# Patient Record
Sex: FEMALE | Race: BLACK OR AFRICAN AMERICAN | NOT HISPANIC OR LATINO | Employment: UNEMPLOYED | URBAN - METROPOLITAN AREA
[De-identification: names, ages, dates, MRNs, and addresses within clinical notes are randomized per-mention and may not be internally consistent; named-entity substitution may affect disease eponyms.]

---

## 2017-04-19 ENCOUNTER — TELEPHONE (OUTPATIENT)
Dept: OBSTETRICS AND GYNECOLOGY | Facility: CLINIC | Age: 32
End: 2017-04-19

## 2017-04-19 DIAGNOSIS — Z34.90 PREGNANCY, UNSPECIFIED GESTATIONAL AGE: Primary | ICD-10-CM

## 2017-04-19 NOTE — TELEPHONE ENCOUNTER
----- Message from Marylou Pierre sent at 4/19/2017  2:39 PM CDT -----  X_  1st Request  _  2nd Request  _  3rd Request        Who: BENITO MCCOY [[id number]]    Why: pt is needing to schedule initial OB appt pt LMP sometime in February     What Number to Call Back: [phone].    When to Expect a call back: (Before the end of the day)   -- if the call is after 12:00, the call back will be tomorrow.

## 2017-04-26 ENCOUNTER — HOSPITAL ENCOUNTER (EMERGENCY)
Facility: OTHER | Age: 32
Discharge: HOME | End: 2017-04-26
Attending: EMERGENCY MEDICINE
Payer: OTHER GOVERNMENT

## 2017-04-26 ENCOUNTER — TELEPHONE (OUTPATIENT)
Dept: OBSTETRICS AND GYNECOLOGY | Facility: CLINIC | Age: 32
End: 2017-04-26

## 2017-04-26 VITALS
HEIGHT: 65 IN | TEMPERATURE: 98 F | SYSTOLIC BLOOD PRESSURE: 109 MMHG | DIASTOLIC BLOOD PRESSURE: 71 MMHG | RESPIRATION RATE: 14 BRPM | BODY MASS INDEX: 24.16 KG/M2 | HEART RATE: 95 BPM | OXYGEN SATURATION: 99 % | WEIGHT: 145 LBS

## 2017-04-26 DIAGNOSIS — B96.89 BACTERIAL VAGINOSIS: ICD-10-CM

## 2017-04-26 DIAGNOSIS — D25.9 UTERINE LEIOMYOMA, UNSPECIFIED LOCATION: ICD-10-CM

## 2017-04-26 DIAGNOSIS — N76.0 BACTERIAL VAGINOSIS: ICD-10-CM

## 2017-04-26 DIAGNOSIS — R10.2 PELVIC PAIN IN PREGNANCY: Primary | ICD-10-CM

## 2017-04-26 DIAGNOSIS — O26.899 PELVIC PAIN IN PREGNANCY: Primary | ICD-10-CM

## 2017-04-26 LAB
ABO + RH BLD: NORMAL
ALBUMIN SERPL BCP-MCNC: 3.4 G/DL
ALP SERPL-CCNC: 69 U/L
ALT SERPL W/O P-5'-P-CCNC: 14 U/L
ANION GAP SERPL CALC-SCNC: 10 MMOL/L
AST SERPL-CCNC: 16 U/L
B-HCG UR QL: POSITIVE
BACTERIA GENITAL QL WET PREP: ABNORMAL
BASOPHILS # BLD AUTO: 0.02 K/UL
BASOPHILS NFR BLD: 0.2 %
BILIRUB SERPL-MCNC: 0.3 MG/DL
BILIRUB UR QL STRIP: NEGATIVE
BLD GP AB SCN CELLS X3 SERPL QL: NORMAL
BUN SERPL-MCNC: 9 MG/DL
CALCIUM SERPL-MCNC: 10.2 MG/DL
CHLORIDE SERPL-SCNC: 104 MMOL/L
CLARITY UR: CLEAR
CLUE CELLS VAG QL WET PREP: ABNORMAL
CO2 SERPL-SCNC: 21 MMOL/L
COLOR UR: YELLOW
CREAT SERPL-MCNC: 0.7 MG/DL
CTP QC/QA: YES
DIFFERENTIAL METHOD: ABNORMAL
EOSINOPHIL # BLD AUTO: 0.1 K/UL
EOSINOPHIL NFR BLD: 1.2 %
ERYTHROCYTE [DISTWIDTH] IN BLOOD BY AUTOMATED COUNT: 14.2 %
EST. GFR  (AFRICAN AMERICAN): >60 ML/MIN/1.73 M^2
EST. GFR  (NON AFRICAN AMERICAN): >60 ML/MIN/1.73 M^2
FILAMENT FUNGI VAG WET PREP-#/AREA: ABNORMAL
GLUCOSE SERPL-MCNC: 87 MG/DL
GLUCOSE UR QL STRIP: NEGATIVE
HCT VFR BLD AUTO: 31.1 %
HGB BLD-MCNC: 10.3 G/DL
HGB UR QL STRIP: NEGATIVE
KETONES UR QL STRIP: ABNORMAL
LEUKOCYTE ESTERASE UR QL STRIP: NEGATIVE
LYMPHOCYTES # BLD AUTO: 1.8 K/UL
LYMPHOCYTES NFR BLD: 16.9 %
MCH RBC QN AUTO: 27 PG
MCHC RBC AUTO-ENTMCNC: 33.1 %
MCV RBC AUTO: 81 FL
MONOCYTES # BLD AUTO: 0.6 K/UL
MONOCYTES NFR BLD: 5.3 %
NEUTROPHILS # BLD AUTO: 8 K/UL
NEUTROPHILS NFR BLD: 76.1 %
NITRITE UR QL STRIP: NEGATIVE
PH UR STRIP: 7 [PH] (ref 5–8)
PLATELET # BLD AUTO: 277 K/UL
PMV BLD AUTO: 9.7 FL
POTASSIUM SERPL-SCNC: 3.8 MMOL/L
PROT SERPL-MCNC: 8.6 G/DL
PROT UR QL STRIP: NEGATIVE
RBC # BLD AUTO: 3.82 M/UL
SODIUM SERPL-SCNC: 135 MMOL/L
SP GR UR STRIP: 1.01 (ref 1–1.03)
SPECIMEN SOURCE: ABNORMAL
T VAGINALIS GENITAL QL WET PREP: ABNORMAL
URN SPEC COLLECT METH UR: ABNORMAL
UROBILINOGEN UR STRIP-ACNC: NEGATIVE EU/DL
WBC # BLD AUTO: 10.43 K/UL
WBC #/AREA VAG WET PREP: ABNORMAL
YEAST GENITAL QL WET PREP: ABNORMAL

## 2017-04-26 PROCEDURE — 96360 HYDRATION IV INFUSION INIT: CPT

## 2017-04-26 PROCEDURE — 87591 N.GONORRHOEAE DNA AMP PROB: CPT

## 2017-04-26 PROCEDURE — 85025 COMPLETE CBC W/AUTO DIFF WBC: CPT

## 2017-04-26 PROCEDURE — 25000003 PHARM REV CODE 250: Performed by: PHYSICIAN ASSISTANT

## 2017-04-26 PROCEDURE — 81003 URINALYSIS AUTO W/O SCOPE: CPT

## 2017-04-26 PROCEDURE — 99284 EMERGENCY DEPT VISIT MOD MDM: CPT | Mod: 25

## 2017-04-26 PROCEDURE — 86900 BLOOD TYPING SEROLOGIC ABO: CPT

## 2017-04-26 PROCEDURE — 86901 BLOOD TYPING SEROLOGIC RH(D): CPT

## 2017-04-26 PROCEDURE — 81025 URINE PREGNANCY TEST: CPT | Performed by: EMERGENCY MEDICINE

## 2017-04-26 PROCEDURE — 87210 SMEAR WET MOUNT SALINE/INK: CPT

## 2017-04-26 PROCEDURE — 80053 COMPREHEN METABOLIC PANEL: CPT

## 2017-04-26 RX ORDER — METRONIDAZOLE 500 MG/1
500 TABLET ORAL 2 TIMES DAILY
Qty: 14 TABLET | Refills: 0 | Status: SHIPPED | OUTPATIENT
Start: 2017-04-26 | End: 2017-05-03

## 2017-04-26 RX ADMIN — SODIUM CHLORIDE 1000 ML: 0.9 INJECTION, SOLUTION INTRAVENOUS at 12:04

## 2017-04-26 NOTE — ED TRIAGE NOTES
Pt c/o low abdominal pain and low back pain since Sunday - no vag bleeding noted - pt is pregnant and has appt 5/10.

## 2017-04-26 NOTE — ED AVS SNAPSHOT
[facility]  [address]  [facility] [address]               Indy Narvaez   2017 12:06 PM   ED    Description:  Female : 1985   Department:  [facility]           Your Care was Coordinated By:     Provider Role From To    Raissa Singleton MD Attending Provider [phone] --    Carlota White PA-C Physician Assistant 17 [phone] --      Reason for Visit     Abdominal Pain           Diagnoses this Visit        Comments    Pelvic pain in pregnancy    -  Primary     Uterine leiomyoma, unspecified location         Bacterial vaginosis           ED Disposition     None           To Do List           Follow-up Information     Follow up with Zaida Maher MD. Schedule an appointment as soon as possible for a visit in 2 days.    Specialty:  Obstetrics and Gynecology    Contact information:    [address] [facility] [address]  [phone]         These Medications        Disp Refills Start End    metronidazole (FLAGYL) 500 MG tablet 14 tablet 0 2017 5/3/2017    Take 1 tablet (500 mg total) by mouth 2 (two) times daily. - Oral      [facility] On Call     [facility] On Call Nurse Care Line -  Assistance  Unless otherwise directed by your provider, please contact [facility], our nurse care line that is available for  assistance.     Registered nurses in the [facility] On Call Center provide: appointment scheduling, clinical advisement, health education, and other advisory services.  Call: [phone] (toll free)               Medications           Message regarding Medications     Verify the changes and/or additions to your medication regime listed below are the same as discussed with your clinician today.  If any of these changes or additions are incorrect, please notify your healthcare provider.        START taking these NEW medications        Refills    metronidazole (FLAGYL) 500 MG tablet 0    Sig: Take 1  "tablet (500 mg total) by mouth 2 (two) times daily.    Class: Print    Route: Oral      These medications were administered today        Dose Freq    sodium chloride 0.9% bolus 1,000 mL 1,000 mL ED 1 Time    Sig: Inject 1,000 mLs into the vein ED 1 Time.    Class: Normal    Route: Intravenous           Verify that the below list of medications is an accurate representation of the medications you are currently taking.  If none reported, the list may be blank. If incorrect, please contact your healthcare provider. Carry this list with you in case of emergency.           Current Medications     PNV with Ca,no.74-iron-FA 27-1 mg Tab Take by mouth.    metronidazole (FLAGYL) 500 MG tablet Take 1 tablet (500 mg total) by mouth 2 (two) times daily.           Clinical Reference Information           Your Vitals Were     BP Pulse Temp Resp Height Weight    114/79 (BP Location: Left arm, Patient Position: Sitting) 121 97.9 °F (36.6 °C) (Oral) 18 5' 5" (1.651 m) 65.8 kg (145 lb)    SpO2 BMI             98% 24.13 kg/m2         Allergies as of 4/26/2017     No Known Allergies      Immunizations Administered on Date of Encounter - 4/26/2017     None      ED Micro, Lab, POCT     Start Ordered       Status Ordering Provider    04/26/17 1317 04/26/17 1316  C. trachomatis/N. gonorrhoeae by AMP DNA Cervicovaginal  STAT      In process     04/26/17 1317 04/26/17 1316  Vaginal Screen Vagina  STAT      Final result     04/26/17 1218 04/26/17 1217  CBC W/ AUTO DIFFERENTIAL  STAT      Final result     04/26/17 1218 04/26/17 1217  Comp. Metabolic Panel  STAT      Final result     04/26/17 1218 04/26/17 1217  Urinalysis  STAT      Final result     04/26/17 1146 04/26/17 1145  POCT urine pregnancy  Once      Final result       ED Imaging Orders     Start Ordered       Status Ordering Provider    04/26/17 1419 04/26/17 1217  US OB More Than 14 Wks First Gestation  1 time imaging      Final result     04/26/17 1218 04/26/17 1217    1 time " imaging,   Status:  Canceled      Canceled       Discharge References/Attachments     VAGINAL INFECTION: BACTERIAL VAGINOSIS (ENGLISH)    UTERINE FIBROIDS (ENGLISH)      Your Scheduled Appointments     May 10, 2017  8:20 AM CDT   New Gynecological with Iva Mcgowan NP   [facility] [address] ([facility])    [address]  [facility] [address]   [phone]            May 10, 2017  9:00 AM CDT   Ultrasound with ULTRASOUND, [facility]   [facility] [address] ([facility])    [address]  [facility] [address]   [phone]              [facility] Sign-Up     Activating your [facility] account is as easy as 1-2-3!     1) Visit [email or web address], select Sign Up Now, enter this activation code and your date of birth, then select Next.  [email or web address]  Expires: 6/10/2017  2:56 PM      2) Create a username and password to use when you visit [facility] in the future and select a security question in case you lose your password and select Next.    3) Enter your e-mail address and click Sign Up!    Additional Information  If you have questions, please e-mail [email or web address] or call [phone] to talk to our [facility] staff. Remember, [facility] is NOT to be used for urgent needs. For medical emergencies, dial 911.          [facility] complies with applicable Federal civil rights laws and does not discriminate on the basis of race, color, national origin, age, disability, or sex.        Language Assistance Services     ATTENTION: Language assistance services are available, free of charge. Please call [phone].      ATENCIÓN: Si sophiela daniel, tiene a gallegos disposición servicios gratuitos de asistencia lingüística. Llame al [phone].     CHÚ Ý: N?u b?n nói Ti?ng Vi?t, có các d?ch v? h? tr? ngôn ng? mi?n phí dành cho b?n. G?i s? [phone].

## 2017-04-26 NOTE — ED PROVIDER NOTES
Encounter Date: 4/26/2017       History     Chief Complaint   Patient presents with    Abdominal Pain     lower abd pain and low back pain since Sunday. denies any n/v/d or fever/chills. denies any difficulty urinating or discharge. 2 months pregnant     Review of patient's allergies indicates:  No Known Allergies  HPI Comments: 31-year-old female with history of uterine fibroids presents to the emergency department with complaints of pelvic pain and low back pain in early pregnancy.  She states her last menstrual cycle was in February.  She states that she thinks that she is approximately 2 months pregnant.  She denies vaginal bleeding, dysuria, hematuria, fever, chills, vaginal discharge, nausea, vomiting, diarrhea or other associated symptoms.  She states the pain is a 9 out of 10 and has been present since Sunday.  Patient states that this is her first pregnancy    The history is provided by the patient and the spouse.     History reviewed. No pertinent past medical history.  Past Surgical History:   Procedure Laterality Date    UTERINE FIBROID SURGERY  2010     History reviewed. No pertinent family history.  Social History   Substance Use Topics    Smoking status: Never Smoker    Smokeless tobacco: None    Alcohol use Yes     Review of Systems   Constitutional: Negative for chills and fever.   HENT: Negative for sore throat.    Respiratory: Negative for shortness of breath.    Cardiovascular: Negative for chest pain.   Gastrointestinal: Positive for abdominal pain. Negative for diarrhea, nausea and vomiting.   Genitourinary: Positive for pelvic pain. Negative for difficulty urinating, dysuria, flank pain, frequency, hematuria, vaginal bleeding and vaginal discharge.   Musculoskeletal: Positive for back pain.   Skin: Negative for rash.   Neurological: Negative for weakness.   Hematological: Does not bruise/bleed easily.       Physical Exam   Initial Vitals   BP Pulse Resp Temp SpO2   04/26/17 1136 04/26/17  1136 04/26/17 1136 04/26/17 1136 04/26/17 1136   114/79 121 18 97.9 °F (36.6 °C) 98 %     Physical Exam    Nursing note and vitals reviewed.  Constitutional: Vital signs are normal. She appears well-developed and well-nourished.  Non-toxic appearance. No distress.   HENT:   Head: Normocephalic and atraumatic.   Right Ear: External ear normal.   Left Ear: External ear normal.   Nose: Nose normal.   Mouth/Throat: Oropharynx is clear and moist.   Eyes: Conjunctivae, EOM and lids are normal. Pupils are equal, round, and reactive to light. No scleral icterus.   Neck: Normal range of motion and phonation normal. Neck supple.   Cardiovascular: Regular rhythm and normal heart sounds. Tachycardia present.  Exam reveals no gallop and no friction rub.    No murmur heard.  Pulmonary/Chest: Effort normal and breath sounds normal. No respiratory distress. She has no decreased breath sounds. She has no wheezes. She has no rhonchi. She has no rales.   Abdominal: Soft. Normal appearance and bowel sounds are normal. She exhibits distension. She exhibits no mass. There is no tenderness. There is no rigidity, no rebound, no guarding, no CVA tenderness, no tenderness at McBurney's point and negative Arvizu's sign.       Well-healed postsurgical scars status post myomectomy.  No surrounding erythema, warmth, induration or edema.  Distended abdomen which appears to be consistent with gravid uterus however it is not symmetrical and I'm concern that this may be secondary to uterine fibroid   Genitourinary: Pelvic exam was performed with patient supine. There is no tenderness or lesion on the right labia. There is no tenderness or lesion on the left labia. Uterus is enlarged and tender. Cervix exhibits no motion tenderness, no discharge and no friability. Right adnexum displays no tenderness. Left adnexum displays no tenderness. No bleeding in the vagina. Vaginal discharge found.   Genitourinary Comments: No acute vaginal discharge.  Uterus  is enlarged with palpable mass concerning for uterine fibroids.   Musculoskeletal: Normal range of motion.   No obvious deformities, moving all extremities, normal gait   Neurological: She is alert and oriented to person, place, and time. She has normal strength and normal reflexes. No sensory deficit.   Skin: Skin is warm, dry and intact. No lesion and no rash noted. No erythema.   Psychiatric: She has a normal mood and affect. Her speech is normal and behavior is normal. Judgment normal. Cognition and memory are normal.         ED Course   Procedures  Labs Reviewed   CBC W/ AUTO DIFFERENTIAL - Abnormal; Notable for the following:        Result Value    RBC 3.82 (*)     Hemoglobin 10.3 (*)     Hematocrit 31.1 (*)     MCV 81 (*)     Gran # 8.0 (*)     Gran% 76.1 (*)     Lymph% 16.9 (*)     All other components within normal limits   COMPREHENSIVE METABOLIC PANEL - Abnormal; Notable for the following:     Sodium 135 (*)     CO2 21 (*)     Total Protein 8.6 (*)     Albumin 3.4 (*)     All other components within normal limits   URINALYSIS - Abnormal; Notable for the following:     Ketones, UA Trace (*)     All other components within normal limits   VAGINAL SCREEN - Abnormal; Notable for the following:     Clue Cells, Wet Prep Few (*)     WBC - Vaginal Screen Occasional (*)     Bacteria - Vaginal Screen Moderate (*)     All other components within normal limits   POCT URINE PREGNANCY - Abnormal; Notable for the following:     POC Preg Test, Ur Positive (*)     All other components within normal limits   C. TRACHOMATIS/N. GONORRHOEAE BY AMP DNA   TYPE & SCREEN        Imaging Results         US OB More Than 14 Wks First Gestation (Final result) Result time:  04/26/17 14:26:58    Procedure changed from US OB Less Than 14 Wks with Transvag(xpd        Final result by Kwadwo Feliz III, MD (04/26/17 14:26:58)    Narrative:    Findings: Heart rate 150 beats per minute. Placenta anterior. Cervix obscured by a fibroid.  Femur length 1.22 CM corresponds to 13 weeks 5 days.      Electronically signed by: ROWDY ESTES  Date:     04/26/17  Time:    14:26                  Medical Decision Making:   History:   I obtained history from: someone other than patient.       <> Summary of History: spouse  Old Medical Records: I decided to obtain old medical records.  Old Records Summarized: records from clinic visits and other records.  Initial Assessment:   31-year-old female with complaints consistent pelvic pain in pregnancy secondary to uterine fibroids.  Initially tachycardic however improved while in the emergency department.  Heart rate now 93 beats per minute. Vital signs stable, afebrile, neurovascular intact.  She is alert, healthy and nontoxic appearing.  She is in no apparent distress.  Exam documented above.  Palpable fibroid on exam both transabdominally and on pelvic exam.  Clinical Tests:   Lab Tests: Ordered and Reviewed  Radiological Study: Ordered and Reviewed  ED Management:  Bedside ultrasound performed by me.  It does appear to be viable fetus with an uterus however hard to visualize likely secondary to early gestation versus distorted anatomy secondary to uterine fibroid.  No elevation in white blood cell count and H&H stable at 10.3 and 31.1.  No vaginal bleeding at this time.  Ultrasound consistent with IUP consistent with 13 weeks 5 days gestation.  Heart rate is 150 bpm.  Patient's cervix is instructed by fibroid.  Placenta is anterior.  She does have a few clue cells so we will treat for possible BV.  2:48 PM discussed with GYN resident on call, Dr Stoll. Recommends tylenol and close f/u with GYN. Appointment scheduled with Dr Maher in 2 days according to GYN. Patient states that her appointment is on May 10. She is urged to call for sooner appointment/f/u in ED if symptoms worsen. She states understanding.  She stable at this time and will be discharged home with care instructions.  This patient was discussed  with the attending physician who agrees with treatment plan.   Other:   I have discussed this case with another health care provider.       <> Summary of the Discussion: Mani  This note was created using Dragon Medical dictation.  There may be typographical errors secondary to dictation.                     ED Course     Clinical Impression:     1. Pelvic pain in pregnancy    2. Uterine leiomyoma, unspecified location    3. Bacterial vaginosis          Disposition:   Disposition: Discharged  Condition: Stable       Carlota White PA-C  04/26/17 1453

## 2017-04-27 ENCOUNTER — TELEPHONE (OUTPATIENT)
Dept: OBSTETRICS AND GYNECOLOGY | Facility: CLINIC | Age: 32
End: 2017-04-27

## 2017-04-27 LAB
C TRACH DNA SPEC QL NAA+PROBE: NOT DETECTED
N GONORRHOEA DNA SPEC QL NAA+PROBE: NOT DETECTED

## 2017-04-27 NOTE — TELEPHONE ENCOUNTER
Called patient and rescheduled her Routine OB visit for Monday 5/1/17. This appointment is also a follow up from a ED visit on 4/26/17. Pregnancy has already been confirmed by ED.

## 2017-04-27 NOTE — TELEPHONE ENCOUNTER
----- Message from Merna Navarrete sent at 4/27/2017  3:44 PM CDT -----  Contact: self  Patient was seen in the ED and is requesting an appointment for this week. Patient can be reached at [phone].

## 2017-05-04 ENCOUNTER — TELEPHONE (OUTPATIENT)
Dept: OBSTETRICS AND GYNECOLOGY | Facility: CLINIC | Age: 32
End: 2017-05-04

## 2017-05-04 NOTE — TELEPHONE ENCOUNTER
----- Message from Mai Kirkpatrick sent at 5/4/2017  4:35 PM CDT -----  Contact: pt  x_  1st Request  _  2nd Request  _  3rd Request      Who:pt    Why: pt states that she would like to reschedule dating ultrasound     What Number to Call Back: [phone]    When to Expect a call back: (Before the end of the day)   -- if call after 3:00 call back will be tomorrow.

## 2017-05-05 ENCOUNTER — TELEPHONE (OUTPATIENT)
Dept: OBSTETRICS AND GYNECOLOGY | Facility: CLINIC | Age: 32
End: 2017-05-05

## 2017-05-05 NOTE — TELEPHONE ENCOUNTER
----- Message from Michelle Mosley sent at 5/5/2017  1:46 PM CDT -----  Contact: Patient  x_ 1st Request  _ 2nd Request  _ 3rd Request    Who: BENITO MCCOY [[id number]]    Why: Patient is returning a call from staff. Patient is needing a call back.    What Number to Call Back: Patient can be reached at [phone].    When to Expect a call back: (Before the end of the day)  -- if call after 3:00 call back will be tomorrow.

## 2017-05-05 NOTE — TELEPHONE ENCOUNTER
Patient wanted to reschedule dating us. I offered patient appt on 5/16 my first available. Patient wanted something sooner due to moving out of town. I verbalized i could not overbook us schedule so I could not do anything sooner. Patient verbalized understanding.

## 2017-05-08 ENCOUNTER — INITIAL PRENATAL (OUTPATIENT)
Dept: OBSTETRICS AND GYNECOLOGY | Facility: CLINIC | Age: 32
End: 2017-05-08
Payer: OTHER GOVERNMENT

## 2017-05-08 VITALS
DIASTOLIC BLOOD PRESSURE: 68 MMHG | BODY MASS INDEX: 25.68 KG/M2 | SYSTOLIC BLOOD PRESSURE: 100 MMHG | WEIGHT: 154.31 LBS

## 2017-05-08 DIAGNOSIS — Z34.02 PRENATAL CARE, FIRST PREGNANCY, SECOND TRIMESTER: Primary | ICD-10-CM

## 2017-05-08 DIAGNOSIS — Z3A.15 15 WEEKS GESTATION OF PREGNANCY: ICD-10-CM

## 2017-05-08 DIAGNOSIS — D25.9 UTERINE LEIOMYOMA, UNSPECIFIED LOCATION: ICD-10-CM

## 2017-05-08 DIAGNOSIS — Z98.890 S/P MYOMECTOMY: ICD-10-CM

## 2017-05-08 DIAGNOSIS — O09.32 INSUFFICIENT PRENATAL CARE, SECOND TRIMESTER: ICD-10-CM

## 2017-05-08 PROCEDURE — 99213 OFFICE O/P EST LOW 20 MIN: CPT | Mod: ,,, | Performed by: NURSE PRACTITIONER

## 2017-05-08 PROCEDURE — 99999 PR PBB SHADOW E&M-EST. PATIENT-LVL III: CPT | Mod: PBBFAC,,, | Performed by: NURSE PRACTITIONER

## 2017-05-08 PROCEDURE — 99213 OFFICE O/P EST LOW 20 MIN: CPT | Mod: PBBFAC | Performed by: NURSE PRACTITIONER

## 2017-05-08 PROCEDURE — 87086 URINE CULTURE/COLONY COUNT: CPT

## 2017-05-09 ENCOUNTER — TELEPHONE (OUTPATIENT)
Dept: OBSTETRICS AND GYNECOLOGY | Facility: CLINIC | Age: 32
End: 2017-05-09

## 2017-05-09 PROBLEM — Z98.890 S/P MYOMECTOMY: Status: ACTIVE | Noted: 2017-05-09

## 2017-05-09 PROBLEM — O34.10 UTERINE FIBROIDS AFFECTING PREGNANCY: Status: ACTIVE | Noted: 2017-05-09

## 2017-05-09 PROBLEM — D25.9 UTERINE FIBROIDS AFFECTING PREGNANCY: Status: ACTIVE | Noted: 2017-05-09

## 2017-05-09 PROBLEM — O09.30 INSUFFICIENT PRENATAL CARE: Status: ACTIVE | Noted: 2017-05-09

## 2017-05-09 PROBLEM — Z34.00 PRENATAL CARE, FIRST PREGNANCY: Status: ACTIVE | Noted: 2017-05-09

## 2017-05-09 NOTE — TELEPHONE ENCOUNTER
Left message for patient to inform her that I scheduled her for her 2wk ob visit with Dr. Maher on 5/23 @ 10:15.

## 2017-05-09 NOTE — TELEPHONE ENCOUNTER
----- Message from Rusty Blackburn sent at 5/9/2017  1:41 PM CDT -----  Contact: pt  x_ 1st Request   _ 2nd Request   _ 3rd Request     Who: BENITO MCCOY [[id number]]    Why: Pt missed your call is requesting a call back    What Number to Call Back:[phone]     When to Expect a call back: (Before the end of the day)   -- if call after 3:00 call back will be tomorrow.

## 2017-05-09 NOTE — TELEPHONE ENCOUNTER
----- Message from Michelle Mosley sent at 5/9/2017  2:20 PM CDT -----  Contact: Patient  x_ 1st Request  _ 2nd Request  _ 3rd Request    Who: BENITO MCCOY [[id number]]    Why: Patient is returning a call from staff. Patient is needing a call back.    What Number to Call Back: Patient can be reached at [phone].    When to Expect a call back: (Before the end of the day)  -- if call after 3:00 call back will be tomorrow.

## 2017-05-09 NOTE — TELEPHONE ENCOUNTER
----- Message from Gael Acevedo sent at 5/9/2017  4:12 PM CDT -----  Contact: Pt  X_ 1st Request  _ 2nd Request  _ 3rd Request    Who:BENITO MCCOY [[id number]]    Why: Patient is returning a call    What Number to Call Back: [phone]    When to Expect a call back: (Before the end of the day)  -- if call after 3:00 call back will be tomorrow.

## 2017-05-09 NOTE — TELEPHONE ENCOUNTER
----- Message from Gael Acevedo sent at 5/9/2017  1:57 PM CDT -----  Contact: Pt  X_ 1st Request  _ 2nd Request  _ 3rd Request    Who:BENITO MCCOY [[id number]]    Why: Patient states she would like to speak with staff in regards to rescheduling her appointment, as well as some other questions she has to ask    What Number to Call Back: [phone]    When to Expect a call back: (Before the end of the day)  -- if call after 3:00 call back will be tomorrow.

## 2017-05-09 NOTE — PROGRESS NOTES
In ED 4-26-17 for pelvic - LBP due to uterine fibroids; She is S/P Myomectomy; Pain is sharp LLQ radiating into back not associated w vb,  or GI sx, fever, chills, discharge; Genprobe in ED neg; On exam she has a large nonpainful fibroid more on the right which protrudes over the uterus making FH measurement difficult but the tenderness is LLQ; Cx LTC;  Advised Tylenol and Ice packs; New OB teaching done, labs ordered; States had neg pap one year ago; M Order placed for dating/detailed, eval of fibroids US (a limited US was done in the ED); E-mail sent to Dr Maher and F/U visit scheduled with Dr Maher in 2 weeks.

## 2017-05-10 LAB
BACTERIA UR CULT: NORMAL
BACTERIA UR CULT: NORMAL

## 2017-05-18 ENCOUNTER — TELEPHONE (OUTPATIENT)
Dept: OBSTETRICS AND GYNECOLOGY | Facility: CLINIC | Age: 32
End: 2017-05-18

## 2017-05-18 NOTE — TELEPHONE ENCOUNTER
Called patient and informed her that small cramps are normal with prenancy. Patient is not having and spotting or bleeding. I informed patient that she can take some regular strength tylenol and drink a lot of water. I also told patient if cramping gets worse and starts bleeding to go to [facility]. Patient verbalized understanding.

## 2017-05-18 NOTE — TELEPHONE ENCOUNTER
----- Message from Rusty Blackburn sent at 5/18/2017 11:05 AM CDT -----  Contact: pt  x_ 1st Request   _ 2nd Request   _ 3rd Request     Who: BENITO MCCOY [[id number]]    Why: pt is requesting a call back in reference to being seen sooner possibly today due to having abdominal pain.  Please call patient.    What Number to Call Back: [phone]    When to Expect a call back: (Before the end of the day)   -- if call after 3:00 call back will be tomorrow.

## 2017-05-19 ENCOUNTER — OFFICE VISIT (OUTPATIENT)
Dept: MATERNAL FETAL MEDICINE | Facility: CLINIC | Age: 32
End: 2017-05-19
Payer: OTHER GOVERNMENT

## 2017-05-19 ENCOUNTER — HOSPITAL ENCOUNTER (OUTPATIENT)
Facility: OTHER | Age: 32
Discharge: HOME | End: 2017-05-20
Attending: OBSTETRICS & GYNECOLOGY | Admitting: OBSTETRICS & GYNECOLOGY
Payer: OTHER GOVERNMENT

## 2017-05-19 DIAGNOSIS — O34.12 UTERINE FIBROID COMPLICATING ANTENATAL CARE, BABY NOT YET DELIVERED, SECOND TRIMESTER: Primary | ICD-10-CM

## 2017-05-19 DIAGNOSIS — Z98.890 S/P MYOMECTOMY: ICD-10-CM

## 2017-05-19 DIAGNOSIS — O09.32 INSUFFICIENT PRENATAL CARE, SECOND TRIMESTER: ICD-10-CM

## 2017-05-19 DIAGNOSIS — D25.9 UTERINE FIBROID COMPLICATING ANTENATAL CARE, BABY NOT YET DELIVERED, SECOND TRIMESTER: Primary | ICD-10-CM

## 2017-05-19 DIAGNOSIS — D25.9 UTERINE LEIOMYOMA, UNSPECIFIED LOCATION: ICD-10-CM

## 2017-05-19 DIAGNOSIS — Z3A.17 17 WEEKS GESTATION OF PREGNANCY: ICD-10-CM

## 2017-05-19 DIAGNOSIS — Z34.02 PRENATAL CARE, FIRST PREGNANCY, SECOND TRIMESTER: ICD-10-CM

## 2017-05-19 PROBLEM — O34.10 UTERINE FIBROID COMPLICATING ANTENATAL CARE, BABY NOT YET DELIVERED: Status: ACTIVE | Noted: 2017-05-19

## 2017-05-19 PROCEDURE — 99219 PR INITIAL OBSERVATION CARE,LEVL II: CPT | Mod: ,,, | Performed by: OBSTETRICS & GYNECOLOGY

## 2017-05-19 PROCEDURE — 99284 EMERGENCY DEPT VISIT MOD MDM: CPT | Mod: ,,, | Performed by: OBSTETRICS & GYNECOLOGY

## 2017-05-19 PROCEDURE — G0378 HOSPITAL OBSERVATION PER HR: HCPCS

## 2017-05-19 PROCEDURE — 76816 OB US FOLLOW-UP PER FETUS: CPT | Mod: PBBFAC | Performed by: PEDIATRICS

## 2017-05-19 PROCEDURE — 25000003 PHARM REV CODE 250: Performed by: STUDENT IN AN ORGANIZED HEALTH CARE EDUCATION/TRAINING PROGRAM

## 2017-05-19 PROCEDURE — 99214 OFFICE O/P EST MOD 30 MIN: CPT | Mod: 25,S$PBB,, | Performed by: PEDIATRICS

## 2017-05-19 PROCEDURE — 99284 EMERGENCY DEPT VISIT MOD MDM: CPT | Mod: 25

## 2017-05-19 PROCEDURE — 76816 OB US FOLLOW-UP PER FETUS: CPT | Mod: 26,S$PBB,, | Performed by: PEDIATRICS

## 2017-05-19 RX ORDER — INDOMETHACIN 25 MG/1
25 CAPSULE ORAL EVERY 6 HOURS
Status: DISCONTINUED | OUTPATIENT
Start: 2017-05-20 | End: 2017-05-20 | Stop reason: HOSPADM

## 2017-05-19 RX ORDER — OXYCODONE AND ACETAMINOPHEN 10; 325 MG/1; MG/1
1 TABLET ORAL EVERY 4 HOURS PRN
Status: DISCONTINUED | OUTPATIENT
Start: 2017-05-19 | End: 2017-05-20 | Stop reason: HOSPADM

## 2017-05-19 RX ORDER — AMOXICILLIN 250 MG
1 CAPSULE ORAL NIGHTLY PRN
Status: DISCONTINUED | OUTPATIENT
Start: 2017-05-19 | End: 2017-05-20 | Stop reason: HOSPADM

## 2017-05-19 RX ORDER — SIMETHICONE 80 MG
1 TABLET,CHEWABLE ORAL EVERY 6 HOURS PRN
Status: DISCONTINUED | OUTPATIENT
Start: 2017-05-19 | End: 2017-05-20 | Stop reason: HOSPADM

## 2017-05-19 RX ORDER — DIPHENHYDRAMINE HCL 25 MG
25 CAPSULE ORAL EVERY 4 HOURS PRN
Status: DISCONTINUED | OUTPATIENT
Start: 2017-05-19 | End: 2017-05-20 | Stop reason: HOSPADM

## 2017-05-19 RX ORDER — INDOMETHACIN 25 MG/1
25 CAPSULE ORAL EVERY 6 HOURS
Status: DISCONTINUED | OUTPATIENT
Start: 2017-05-20 | End: 2017-05-19

## 2017-05-19 RX ORDER — PRENATAL WITH FERROUS FUM AND FOLIC ACID 3080; 920; 120; 400; 22; 1.84; 3; 20; 10; 1; 12; 200; 27; 25; 2 [IU]/1; [IU]/1; MG/1; [IU]/1; MG/1; MG/1; MG/1; MG/1; MG/1; MG/1; UG/1; MG/1; MG/1; MG/1; MG/1
1 TABLET ORAL DAILY
Status: DISCONTINUED | OUTPATIENT
Start: 2017-05-20 | End: 2017-05-20 | Stop reason: HOSPADM

## 2017-05-19 RX ORDER — DIPHENHYDRAMINE HYDROCHLORIDE 50 MG/ML
25 INJECTION INTRAMUSCULAR; INTRAVENOUS EVERY 4 HOURS PRN
Status: DISCONTINUED | OUTPATIENT
Start: 2017-05-19 | End: 2017-05-20 | Stop reason: HOSPADM

## 2017-05-19 RX ORDER — INDOMETHACIN 25 MG/1
25 CAPSULE ORAL ONCE
Status: COMPLETED | OUTPATIENT
Start: 2017-05-19 | End: 2017-05-19

## 2017-05-19 RX ORDER — OXYCODONE AND ACETAMINOPHEN 5; 325 MG/1; MG/1
1 TABLET ORAL EVERY 4 HOURS PRN
Status: DISCONTINUED | OUTPATIENT
Start: 2017-05-19 | End: 2017-05-20 | Stop reason: HOSPADM

## 2017-05-19 RX ORDER — ONDANSETRON 4 MG/1
8 TABLET, ORALLY DISINTEGRATING ORAL EVERY 8 HOURS PRN
Status: DISCONTINUED | OUTPATIENT
Start: 2017-05-19 | End: 2017-05-20 | Stop reason: HOSPADM

## 2017-05-19 RX ADMIN — OXYCODONE HYDROCHLORIDE AND ACETAMINOPHEN 1 TABLET: 10; 325 TABLET ORAL at 08:05

## 2017-05-19 RX ADMIN — INDOMETHACIN 25 MG: 25 CAPSULE ORAL at 05:05

## 2017-05-19 NOTE — IP AVS SNAPSHOT
[facility] Location ([facility])  [address] [place], [place] [id number]  Phone: [phone]           Patient Discharge Instructions   Our goal is to set you up for success. This packet includes information on your condition, medications, and your home care.  It will help you care for yourself to prevent having to return to the hospital.     Please ask your nurse if you have any questions.      There are many details to remember when preparing to leave the hospital. Here is what you will need to do:    1. Take your medicine. If you are prescribed medications, review your Medication List on the following pages. You may have new medications to  at the pharmacy and others that you'll need to stop taking. Review the instructions for how and when to take your medications. Talk with your doctor or nurses if you are unsure of what to do.     2. Go to your follow-up appointments. Specific follow-up information is listed in the following pages. Your may be contacted by a nurse or clinical provider about future appointments. Be sure we have all of the phone numbers to reach you. Please contact your provider's office if you are unable to make an appointment.     3. Watch for warning signs. Your doctor or nurse will give you detailed warning signs to watch for and when to call for assistance. These instructions may also include educational information about your condition. If you experience any of warning signs to your health, call your doctor.           [facility]  Unless otherwise directed by your provider, please   contact [facility], our nurse care line   that is available for 24/7 assistance.     [phone] (toll-free)     Registered nurses in the [facility]   provide: appointment scheduling, clinical advisement, health education, and other advisory services.                  ** Verify the list of medication(s) below is accurate and up to date. Carry this with you in case of  emergency. If your medications have changed, please notify your healthcare provider.             Medication List      START taking these medications        Additional Info                      indomethacin 25 MG capsule   Commonly known as:  INDOCIN   Quantity:  8 capsule   Refills:  0   Dose:  25 mg    Last time this was given:  25 mg on 5/20/2017  5:35 AM   Instructions:  Take 1 capsule (25 mg total) by mouth every 6 (six) hours.     Begin Date    AM    Noon    PM    Bedtime         CONTINUE taking these medications        Additional Info                      PNV with Ca,no.74-iron-FA 27-1 mg Tab   Refills:  0    Instructions:  Take by mouth.     Begin Date    AM    Noon    PM    Bedtime            Where to Get Your Medications      You can get these medications from any pharmacy     Bring a paper prescription for each of these medications     indomethacin 25 MG capsule                  Please bring to all follow up appointments:    1. A copy of your discharge instructions.  2. All medicines you are currently taking in their original bottles.  3. Identification and insurance card.    Please arrive 15 minutes ahead of scheduled appointment time.    Please call 24 hours in advance if you must reschedule your appointment and/or time.        Your Scheduled Appointments     May 23, 2017 10:15 AM CDT   Routine Prenatal Visit with Zaida Maher MD   [facility] [address] ([facility])    [address] [address] [phone]              Follow-up Information     Schedule an appointment as soon as possible for a visit with [facility] in [place].        Discharge Instructions     Future Orders    Activity as tolerated     Comments:    Pelvic rest for 6 weeks until cleared by MD    Call MD for:  difficulty breathing or increased cough     Call MD for:  increased confusion or weakness     Call MD for:  persistent dizziness, light-headedness, or visual disturbances      "Call MD for:  persistent nausea and vomiting or diarrhea     Call MD for:  redness, tenderness, or signs of infection (pain, swelling, redness, odor or green/yellow discharge around incision site)     Call MD for:  severe persistent headache     Call MD for:  severe uncontrolled pain     Call MD for:  temperature >100.4     Call MD for:  worsening rash     Diet general     Questions:    Total calories:      Fat restriction, if any:      Protein restriction, if any:      Na restriction, if any:      Fluid restriction:      Additional restrictions:      No dressing needed         Discharge Instructions       Call clinic [phone] or L & D after hours at [phone] for vaginal bleeding, leakage of fluids, contractions 4-5 in one hour, decreased fetal movements ( 10 kicks in 2 hours), headache not relieved by Tylenol, blurry vision, or temp of 100.4 or greater.  Begin doing fetal kick counts, at least 10 movements in 2 hours starting at 28 weeks gestation.  Keep next clinic appointment        Discharge References/Attachments     FIBROIDS, WHAT ARE (ENGLISH)    UTERINE FIBROIDS (ENGLISH)        Primary Diagnosis     Your primary diagnosis was:  Uterine Fibroid      Admission Information     Date & Time Provider Department CSN    5/19/2017  4:06 PM Delma Mckeon MD [facility] [id number]      Care Providers     Provider Role Specialty Primary office phone    Delma Mckeon MD Attending Provider Obstetrics [phone]      Your Vitals Were     BP Pulse Temp Resp Height Weight    100/59 (BP Location: Right arm, Patient Position: Lying, BP Method: Automatic) 76 97.9 °F (36.6 °C) (Temporal) 18 5' 5" (1.651 m) 70.3 kg (155 lb)    SpO2 BMI             100% 25.79 kg/m2         Recent Lab Values     No lab values to display.      Allergies as of 5/20/2017     No Known Allergies      Advance Directives     An advance directive is a document which, in the event you are no longer able to make decisions for " yourself, tells your healthcare team what kind of treatment you do or do not want to receive, or who you would like to make those decisions for you.  If you do not currently have an advance directive, [facility] encourages you to create one.  For more information call:  [phone]), [phone] ([phone]),  or log on to [email or web address].        Language Assistance Services     ATTENTION: Language assistance services are available, free of charge. Please call [phone].      ATENCIÓN: Si habla español, tiene a gallegos disposición servicios gratuitos de asistencia lingüística. Llame al [phone].     CHÚ Ý: N?u b?n nói Ti?ng Vi?t, có các d?ch v? h? tr? ngôn ng? mi?n phí dành cho b?n. G?i s? [phone].        [facility] Sign-Up     Activating your [facility] account is as easy as 1-2-3!     1) Visit [email or web address], select Sign Up Now, enter this activation code and your date of birth, then select Next.  [email or web address]  Expires: 6/10/2017  2:56 PM      2) Create a username and password to use when you visit [facility] in the future and select a security question in case you lose your password and select Next.    3) Enter your e-mail address and click Sign Up!    Additional Information  If you have questions, please e-mail [email or web address] or call [phone] to talk to our [facility] staff. Remember, [facility] is NOT to be used for urgent needs. For medical emergencies, dial 911.          [facility] complies with applicable Federal civil rights laws and does not discriminate on the basis of race, color, national origin, age, disability, or sex.

## 2017-05-19 NOTE — ED TRIAGE NOTES
Pt arrived to OB ED from clinic with complaints of pain related to fibroids. Nil leaking of fluid or bleeding down below. Resident notified of arrival.

## 2017-05-19 NOTE — ED PROVIDER NOTES
Encounter Date: 2017       History     Chief Complaint   Patient presents with    Fibroids     Review of patient's allergies indicates:  No Known Allergies  HPI Comments: Indy Narvaez is a 31 y.o. F at 17w0d who is sent up from [facility] clinic where she was noted to be in significant pain 2/2 large fibroids, some of which are noted to be necrosing. Patient states she has been cramping since last month, but the pain has gotten much worse this week. Per report, she was hardly able to tolerate exam with transvaginal ultrasound probe. She denies VB, LOF. Reports +FM.   This IUP is complicated by large fibroids (14cm x 14cm x 14cm largest, 3 other large fibroids approximately 5cm x 5cm). Patient has h/o abdominal myomectomy that will necessitate c/s. Denies F/C, N/V.     No past medical history on file.  Past Surgical History:   Procedure Laterality Date    MYOMECTOMY       History reviewed. No pertinent family history.  Social History   Substance Use Topics    Smoking status: Never Smoker    Smokeless tobacco: None    Alcohol use Yes     Review of Systems   Constitutional: Negative for chills and fever.   HENT: Negative for congestion.    Eyes: Negative for visual disturbance.   Respiratory: Negative for shortness of breath.    Cardiovascular: Negative for chest pain.   Gastrointestinal: Positive for abdominal pain. Negative for nausea and vomiting.   Genitourinary: Positive for pelvic pain. Negative for vaginal bleeding.   Musculoskeletal: Positive for back pain.   Neurological: Negative for light-headedness and headaches.       Physical Exam   Initial Vitals   BP Pulse Resp Temp SpO2   17 1612 17 1611 -- 17 1615 17 1612   108/67 92  97.9 °F (36.6 °C) 100 %     Physical Exam    Constitutional: She appears well-developed and well-nourished. No distress.   HENT:   Head: Normocephalic and atraumatic.   Eyes: Conjunctivae and EOM are normal.   Neck: Normal range of motion. Neck  supple.   Cardiovascular: Normal rate and regular rhythm.   Pulmonary/Chest: Breath sounds normal. No respiratory distress.   Abdominal: Soft. She exhibits no distension. There is tenderness. There is no rebound and no guarding.   Musculoskeletal: Normal range of motion. She exhibits no edema.   Neurological: She is alert and oriented to person, place, and time.   Skin: Skin is warm and dry.   Psychiatric: She has a normal mood and affect. Her behavior is normal. Judgment and thought content normal.     OB LABOR EXAM:                     Comments: Gravid uterus, size >> dates. Large fibroid noted anteriorly and very TTP.    FHTs 145bpm    ED Course   Procedures  Labs Reviewed - No data to display                APC / Resident Notes:   Indocin 25mg given in triage with mild pain relief.  Patient states moving to [place] on Sunday and likely will not be able to follow-up with Dr. Maher as scheduled.  Will admit to observation overnight for pain control.          Attending Attestation:   Physician Attestation Statement for Resident:  As the supervising MD   Physician Attestation Statement: I have personally seen and examined this patient.   I agree with the above history. -:   As the supervising MD I agree with the above PE.    As the supervising MD I agree with the above treatment, course, plan, and disposition.   -: Due to patient moving soon, will keep overnight so that adequate pain control can be obtained  I have reviewed the following: old records at this facility.                    ED Course   Comment By Time   I evaluated Ms. Putnam and discussed plan with Dr. Hou.  Ms. Putnam was found to have very large and painful fibroids, [id number], found today on US.  She is sent here for pain control.  We will try indomethacin here and recheck in an hour.  If it helps,we can d/c home for a 2 day course with addition of percocet. Lesli Ragland, DO 05/19 5472     Clinical Impression:   The primary encounter  diagnosis was Uterine fibroid complicating  care, baby not yet delivered, second trimester. Diagnoses of S/P myomectomy and 17 weeks gestation of pregnancy were also pertinent to this visit.    Disposition:   Disposition: Discharged  Condition: Stable       Christina Hou MD  Resident  17 1820       Lesli Ragland,   17 1030

## 2017-05-19 NOTE — H&P
"History and Physical  Antepartum          Subjective:      Indy Narvaez is a 31 y.o. F at 17w0d who is sent up from [facility] clinic where she was noted to be in significant pain 2/2 large fibroids, some of which are noted to be necrosing. Patient states she has been cramping since last month, but the pain has gotten much worse this week. Per report, she was hardly able to tolerate exam with transvaginal ultrasound probe. She denies VB, LOF. Reports +FM.   This IUP is complicated by large fibroids (14cm x 14cm x 14cm largest, 8i8f4jb, 6g9p4kw, 7p6l4nu). Patient has h/o abdominal myomectomy that will necessitate c/s. Denies F/C, N/V.     PMHx: No past medical history on file.    PSHx:   Past Surgical History:   Procedure Laterality Date    MYOMECTOMY         All: Review of patient's allergies indicates:  No Known Allergies    Meds:   Prescriptions Prior to Admission   Medication Sig Dispense Refill Last Dose    PNV with Ca,no.74-iron-FA 27-1 mg Tab Take by mouth.          SH:   Social History     Social History    Marital status:      Spouse name: N/A    Number of children: N/A    Years of education: N/A     Occupational History    Not on file.     Social History Main Topics    Smoking status: Never Smoker    Smokeless tobacco: Not on file    Alcohol use Yes    Drug use: Not on file    Sexual activity: Not on file     Other Topics Concern    Not on file     Social History Narrative       FH: History reviewed. No pertinent family history.    OBHx:   Obstetric History       T0      TAB0   SAB0   E0   M0   L0       # Outcome Date GA Lbr Mac/2nd Weight Sex Delivery Anes PTL Lv   1 Current                   Objective:       /73  Pulse 91  Temp 98.5 °F (36.9 °C) (Temporal)   Resp 16  Ht 5' 5" (1.651 m)  Wt 70.3 kg (155 lb)  SpO2 100%  Breastfeeding? No  BMI 25.79 kg/m2    Temp:  [97.9 °F (36.6 °C)-98.5 °F (36.9 °C)] 98.5 °F (36.9 °C)  Pulse:  [85-92] 91  Resp:  [16] " 16  SpO2:  [99 %-100 %] 100 %  BP: (101-117)/(67-81) 117/73    Constitutional: She appears well-developed and well-nourished. No distress.   HENT:   Head: Normocephalic and atraumatic.   Eyes: Conjunctivae and EOM are normal.   Neck: Normal range of motion. Neck supple.   Cardiovascular: Normal rate and regular rhythm.   Pulmonary/Chest: Breath sounds normal. No respiratory distress.   Abdominal: Soft. She exhibits no distension. There is no rebound and no guarding.   Musculoskeletal: Normal range of motion. She exhibits no edema.   Neurological: She is alert and oriented to person, place, and time.   Skin: Skin is warm and dry.   Psychiatric: She has a normal mood and affect. Her behavior is normal. Judgment and thought content normal.     Cervical exam: deferred     Fetal Heart Tones: 145 bpm    Ultrasound:  cephalic    Lab Review  Blood Type O POS  GBBS: unknown  Rubella: Unknown  RPR: unknown  HIV: unknown  HepB: unknown    Assessment:       17w0d weeks gestation presents for management of Uterine fibroid complicating  care, baby not yet delivered    Active Hospital Problems    Diagnosis  POA    *Uterine fibroid complicating  care, baby not yet delivered [O34.10, D25.9]  Yes      Resolved Hospital Problems    Diagnosis Date Resolved POA   No resolved problems to display.            Plan:   Admit to antepartum service. Consents for blood transfusion and  delivery, vaginal delivery signed and to chart. Risks, benefits, alternatives and possible complications have been discussed in detail with the patient.        1. Uterine fibroid complicating  care, baby not yet delivered  - will admit to obs as patient is moving to [place] early next week and is without follow-up  - will start Indocin 25mg q6h with Percocet prn    2. IUP at 17w0d  - PNV  - EFW 52% today, 17  - regular diet  - FHTs qshift      Christina Aparicio MD

## 2017-05-20 VITALS
HEIGHT: 65 IN | TEMPERATURE: 98 F | OXYGEN SATURATION: 100 % | RESPIRATION RATE: 18 BRPM | SYSTOLIC BLOOD PRESSURE: 100 MMHG | BODY MASS INDEX: 25.83 KG/M2 | DIASTOLIC BLOOD PRESSURE: 59 MMHG | WEIGHT: 155 LBS | HEART RATE: 76 BPM

## 2017-05-20 PROCEDURE — 25000003 PHARM REV CODE 250: Performed by: STUDENT IN AN ORGANIZED HEALTH CARE EDUCATION/TRAINING PROGRAM

## 2017-05-20 PROCEDURE — G0378 HOSPITAL OBSERVATION PER HR: HCPCS

## 2017-05-20 PROCEDURE — 99217 PR OBSERVATION CARE DISCHARGE: CPT | Mod: ,,, | Performed by: OBSTETRICS & GYNECOLOGY

## 2017-05-20 RX ORDER — INDOMETHACIN 25 MG/1
25 CAPSULE ORAL EVERY 6 HOURS
Qty: 8 CAPSULE | Refills: 0 | Status: SHIPPED | OUTPATIENT
Start: 2017-05-20

## 2017-05-20 RX ADMIN — INDOMETHACIN 25 MG: 25 CAPSULE ORAL at 05:05

## 2017-05-20 RX ADMIN — INDOMETHACIN 25 MG: 25 CAPSULE ORAL at 12:05

## 2017-05-20 NOTE — ASSESSMENT & PLAN NOTE
- abdominal pain has improved  - pt desires discharge  - will plan for d/c with 2 days of indocin RX to complete 3 day course

## 2017-05-20 NOTE — SUBJECTIVE & OBJECTIVE
Obstetric HPI:  Pt reports abdominal pain has significantly improved. Denies VB, denies LOF, denies contractions. She desires discharge home this morning.    Objective:     Vital Signs (Most Recent):  Temp: 97.9 °F (36.6 °C) (05/20/17 0412)  Pulse: 76 (05/20/17 0412)  Resp: 18 (05/19/17 1916)  BP: (!) 100/59 (05/20/17 0412)  SpO2: 100 % (05/19/17 1916) Vital Signs (24h Range):  Temp:  [97.9 °F (36.6 °C)-98.5 °F (36.9 °C)] 97.9 °F (36.6 °C)  Pulse:  [76-96] 76  Resp:  [16-18] 18  SpO2:  [99 %-100 %] 100 %  BP: ()/(56-81) 100/59     Weight: 70.3 kg (155 lb)  Body mass index is 25.79 kg/(m^2).    FHT: 145 bpm    No intake or output data in the 24 hours ending 05/20/17 0603    Cervical Exam:deferred     Significant Labs:  Recent Lab Results     None          Physical Exam:   Constitutional: She is oriented to person, place, and time. She appears well-developed and well-nourished. No distress.    HENT:   Head: Normocephalic and atraumatic.     Neck: Normal range of motion. Neck supple.    Cardiovascular: Normal rate and regular rhythm.     Pulmonary/Chest: Effort normal and breath sounds normal.        Abdominal: Soft. She exhibits no distension. There is tenderness (mild, improved). There is no guarding.   Gravid uterus with large fibroids apparent on exam, tenderness has improved since admission                 Neurological: She is alert and oriented to person, place, and time.    Skin: Skin is warm and dry.    Psychiatric: She has a normal mood and affect. Her behavior is normal.

## 2017-05-20 NOTE — ASSESSMENT & PLAN NOTE
- pt was seeing Dr. Maher  - She is moving to [place] tomorrow  - Discussed importance of close OB follow up in [place]

## 2017-05-20 NOTE — PROGRESS NOTES
[facility]  Obstetrics  Antepartum Progress Note    Patient Name: Indy Narvaez  MRN: [id number]  Admission Date: 2017  Hospital Length of Stay: 0 days  Attending Physician: Delma Mckeon MD  Primary Care Provider: Primary Doctor No    Subjective:     Principal Problem:Uterine fibroid complicating  care, baby not yet delivered    HPI:   Indy Narvaez is a 31 y.o. F at 17w0d who is sent up from [facility] clinic the afternoon of admission where she was noted to be in significant pain 2/2 large fibroids, some of which are noted to be necrosing. Patient states she has been cramping since last month, but the pain has gotten much worse this week. Per report, she was hardly able to tolerate exam with transvaginal ultrasound probe. She denies VB, LOF. Reports +FM.   This IUP is complicated by large fibroids (14cm x 14cm x 14cm largest, 5u1c0ln, 7d6h6ou, 4s8t2by). Patient has h/o abdominal myomectomy that will necessitate c/s. Denies F/C, N/V.       Hospital Course:       Obstetric HPI:  Pt reports abdominal pain has significantly improved. Denies VB, denies LOF, denies contractions. She desires discharge home this morning.    Objective:     Vital Signs (Most Recent):  Temp: 97.9 °F (36.6 °C) (17)  Pulse: 76 (17)  Resp: 18 (17)  BP: (!) 100/59 (17)  SpO2: 100 % (17) Vital Signs (24h Range):  Temp:  [97.9 °F (36.6 °C)-98.5 °F (36.9 °C)] 97.9 °F (36.6 °C)  Pulse:  [76-96] 76  Resp:  [16-18] 18  SpO2:  [99 %-100 %] 100 %  BP: ()/(56-81) 100/59     Weight: 70.3 kg (155 lb)  Body mass index is 25.79 kg/(m^2).    FHT: 145 bpm    No intake or output data in the 24 hours ending 17 0603    Cervical Exam:deferred     Significant Labs:  Recent Lab Results     None          Physical Exam:   Constitutional: She is oriented to person, place, and time. She appears well-developed and well-nourished. No distress.    HENT:   Head:  Normocephalic and atraumatic.     Neck: Normal range of motion. Neck supple.    Cardiovascular: Normal rate and regular rhythm.     Pulmonary/Chest: Effort normal and breath sounds normal.        Abdominal: Soft. She exhibits no distension. There is tenderness (mild, improved). There is no guarding.   Gravid uterus with large fibroids apparent on exam, tenderness has improved since admission                 Neurological: She is alert and oriented to person, place, and time.    Skin: Skin is warm and dry.    Psychiatric: She has a normal mood and affect. Her behavior is normal.       Assessment/Plan:     31 y.o. female  at 17w1d for:    * Uterine fibroid complicating  care, baby not yet delivered  - abdominal pain has improved  - pt desires discharge  - will plan for d/c with 2 days of indocin RX to complete 3 day course      Insufficient prenatal care - 15 weeks  - pt was seeing Dr. Maher  - She is moving to [place] tomorrow  - Discussed importance of close OB follow up in [place]        Radhika Hobbs MD  Obstetrics  [facility]

## 2017-05-20 NOTE — DISCHARGE SUMMARY
[facility]  Discharge Summary  Obstetrics - Antepartum      Admit Date: 2017    Discharge Date and Time:  2017 6:18 AM    Discharge Attending Physician: Delma Mckeon MD     Discharge Provider: Radhika Hobbs    Reason for Admission: Abdominal pain 2/2 degenerating fibroid    Procedures Performed: * No surgery found *    Hospital Course: Indy Narvaez is a 31 y.o. F at 17w0d who was sent up from [facility] clinic the afternoon of admission where she was noted to be in significant pain 2/2 large fibroids, some of which are noted to be necrosing. Patient stated she has been cramping since last month, but the pain has gotten much worse this week. Per report, she was hardly able to tolerate exam with transvaginal ultrasound probe.  This IUP complicated by large fibroids (14cm x 14cm x 14cm largest, 3k5h1sr, 2y8d3nz, 3j0y8xf). Patient has h/o abdominal myomectomy that will necessitate c/s. Denies F/C, N/V. Pt stared on indocin, pain has significantly improved with indocin. Will d/c with RX to complete 3 day course of indocin.      Consults: none    Significant Diagnostic Studies: Labs: CMP No results for input(s): NA, K, CL, CO2, GLU, BUN, CREATININE, CALCIUM, PROT, ALBUMIN, BILITOT, ALKPHOS, AST, ALT, ANIONGAP, ESTGFRAFRICA, EGFRNONAA in the last 48 hours. and CBC No results for input(s): WBC, HGB, HCT, PLT in the last 48 hours.    Final Diagnoses:   Principal Problem: Uterine fibroid complicating  care, baby not yet delivered   Secondary Diagnoses:   Active Hospital Problems    Diagnosis  POA    *Uterine fibroid complicating  care, baby not yet delivered [O34.10, D25.9]  Yes    Insufficient prenatal care - 15 weeks [O09.30]  Not Applicable      Resolved Hospital Problems    Diagnosis Date Resolved POA   No resolved problems to display.       Discharged Condition: good    Disposition: Home or Self Care    Follow Up/Patient Instructions:     Medications:  Reconciled  Home Medications:   Current Discharge Medication List      START taking these medications    Details   indomethacin (INDOCIN) 25 MG capsule Take 1 capsule (25 mg total) by mouth every 6 (six) hours.  Qty: 8 capsule, Refills: 0         CONTINUE these medications which have NOT CHANGED    Details   PNV with Ca,no.74-iron-FA 27-1 mg Tab Take by mouth.             Discharge Procedure Orders  Diet general     Activity as tolerated   Order Comments: Pelvic rest for 6 weeks until cleared by MD     Call MD for:  temperature >100.4     Call MD for:  persistent nausea and vomiting or diarrhea     Call MD for:  severe uncontrolled pain     Call MD for:  redness, tenderness, or signs of infection (pain, swelling, redness, odor or green/yellow discharge around incision site)     Call MD for:  difficulty breathing or increased cough     Call MD for:  severe persistent headache     Call MD for:  worsening rash     Call MD for:  persistent dizziness, light-headedness, or visual disturbances     Call MD for:  increased confusion or weakness     No dressing needed       Follow-up Information     Schedule an appointment as soon as possible for a visit with [facility] in [place].        Radhika Hobbs MD  PGY 3 OB/GYN  [phone]

## 2017-05-20 NOTE — DISCHARGE INSTRUCTIONS
Call clinic [phone] or L & D after hours at [phone] for vaginal bleeding, leakage of fluids, contractions 4-5 in one hour, decreased fetal movements ( 10 kicks in 2 hours), headache not relieved by Tylenol, blurry vision, or temp of 100.4 or greater.  Begin doing fetal kick counts, at least 10 movements in 2 hours starting at 28 weeks gestation.  Keep next clinic appointment

## 2017-05-20 NOTE — PLAN OF CARE
Problem: Patient Care Overview  Goal: Plan of Care Review  Outcome: Ongoing (interventions implemented as appropriate)  Pt doing well, no acute changes during this shift, vital signs stable, no signs of distress, pt denies contraction, vaginal bleeding, and LOF. Will continue to monitor patient

## 2017-05-22 NOTE — PROGRESS NOTES
Indication  ========    Dating.    History  ======    Risk Factors  Details: Fibroids    Method  ======    Transabdominal ultrasound examination. View: Suboptimal view: fibroids prevented a clear view of the adnexa.    Pregnancy  =========    [facility] pregnancy. Number of fetuses: 1.    Dating  ======    Cycle: LMP date uncertain  Ultrasound examination on: 5/19/2017  GA by U/S based upon: AC, BPD, Femur, HC  GA by U/S 16 w + 5 d  GHAZALA by U/S: 10/29/2017  Assigned: Dating performed on 05/19/2017, based on ultrasound (AC, BPD, Femur, HC)  Assigned GA 16 w + 5 d  Assigned GHAZALA: 10/29/2017    General Evaluation  ==============    Cardiac activity: present.  bpm.  Fetal movements: visualized.  Presentation: cephalic.  Placenta: left lateral, low lying.  Umbilical cord: 3 vessel cord.  Amniotic fluid: normal amount.    Fetal Biometry  ============    Fetal Biometry  BPD 33.1 mm 16w 2d [place]  OFD 46.1 mm 17w 4d [place]  .8 mm 16w 4d Hadlock  .6 mm 17w 2d Hadlock  Femur 21.9 mm 16w 4d Hadlock  Cerebellum tr 16.0 mm 16w 6d Do  CM 2.8 mm  Humerus 21.0 mm 16w 2d [place]   g 52% 16w 5d Hadlock  Calculated by: Hadlock (BPD-HC-AC-FL)  EFW (lb) 0 lb  EFW (oz) 6 oz  Cephalic index 0.72  HC / AC 1.12  FL / BPD 0.66  FL / AC 0.19   bpm    Fetal Anatomy  ============    Cranium: normal  Lateral ventricles: normal  Choroid plexus: normal  Midline falx: normal  Cavum septi pellucidi: normal  Cerebellum: normal  4-chamber view: normal  Stomach: normal  Kidneys: suboptimal  Bladder: normal  Arms: both visualized  Legs: both visualized  Wants to know gender: no    Maternal Structures  ===============    Uterus / Cervix  Fibroids: Fibroids identified  Findings: Right lateral wall  D1 140.8 mm  D2 142.3 mm  D3 140.3 mm  Mean 141.1 mm  Vol 1,471.174 cm³  Findings: Anterior  D1 54.0 mm  D2 37.0 mm  D3 66.7 mm  Mean 52.6 mm  Vol 69.709 cm³  Findings: Anterior  D1 48.5 mm  D2 39.2 mm  D3 52.8 mm  Mean 46.8  mm  Vol 52.565 cm³  Findings: Posterior  D1 75.7 mm  D2 59.6 mm  D3 78.0 mm  Mean 71.1 mm  Vol 184.363 cm³  Cervical length 55.1 mm  Ovaries / Tubes / Adnexa  Rt ovary: Suboptimal  Lt ovary: Suboptimal        Impression  =========    US is c/w provided dates. Limited anatomy is normal.    4 large fibroids are noted and measured (see above). The largest on the right lateral wall appears to be necrosing (multiple areas of  liquefaction). The smaller posterior fibroid is likely to impede delivery if it remains that size and position.    Patient is in extreme pain; could not tolerate pressure of transducer. Acetaminophen has not been effective.      I spent 25 minutes in direct consultation and care management with greater than 50% in face to face discussion.          Recommendation  ==============    1. OB ED  2. Recommend NSAID (toradol, ibuprofen, indocin) with addition of opioid if not ameliorated by NSAID (hydrocodone, oxycodone).    Dr. Mckeon has been notified.      Thank you again for allowing us to participate in the care of your patients. If you have any questions concerning today's consultation, feel free  to contact me or one of my partners. We can be reached at [phone] during normal business hours. If you have a question after normal  business hours, please contact Labor and Delivery at [phone].
